# Patient Record
Sex: FEMALE | Race: WHITE | NOT HISPANIC OR LATINO | Employment: STUDENT | URBAN - METROPOLITAN AREA
[De-identification: names, ages, dates, MRNs, and addresses within clinical notes are randomized per-mention and may not be internally consistent; named-entity substitution may affect disease eponyms.]

---
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
## 2025-04-03 ENCOUNTER — HOSPITAL ENCOUNTER (OUTPATIENT)
Facility: HOSPITAL | Age: 20
Discharge: HOME | End: 2025-04-04
Attending: STUDENT IN AN ORGANIZED HEALTH CARE EDUCATION/TRAINING PROGRAM | Admitting: UROLOGY
Payer: COMMERCIAL

## 2025-04-03 DIAGNOSIS — N20.1 RIGHT URETERAL STONE: ICD-10-CM

## 2025-04-03 LAB
ALBUMIN SERPL-MCNC: 3.9 G/DL (ref 3.5–5)
ALBUMIN/GLOB SERPL: 1.3 RATIO (ref 1.1–2)
ALP SERPL-CCNC: 61 UNIT/L (ref 40–150)
ALT SERPL-CCNC: 10 UNIT/L (ref 0–55)
ANION GAP SERPL CALC-SCNC: 10 MEQ/L
AST SERPL-CCNC: 18 UNIT/L (ref 11–45)
B-HCG UR QL: NEGATIVE
BACTERIA #/AREA URNS AUTO: ABNORMAL /HPF
BASOPHILS # BLD AUTO: 0.02 X10(3)/MCL
BASOPHILS NFR BLD AUTO: 0.3 %
BILIRUB SERPL-MCNC: 0.5 MG/DL
BILIRUB UR QL STRIP.AUTO: ABNORMAL
BUN SERPL-MCNC: 12.7 MG/DL (ref 7–18.7)
CALCIUM SERPL-MCNC: 9.3 MG/DL (ref 8.4–10.2)
CHLORIDE SERPL-SCNC: 108 MMOL/L (ref 98–107)
CLARITY UR: ABNORMAL
CO2 SERPL-SCNC: 24 MMOL/L (ref 22–29)
COLOR UR AUTO: ABNORMAL
CREAT SERPL-MCNC: 1.03 MG/DL (ref 0.55–1.02)
CREAT/UREA NIT SERPL: 12
EOSINOPHIL # BLD AUTO: 0.03 X10(3)/MCL (ref 0–0.9)
EOSINOPHIL NFR BLD AUTO: 0.4 %
ERYTHROCYTE [DISTWIDTH] IN BLOOD BY AUTOMATED COUNT: 12 % (ref 11.5–17)
GFR SERPLBLD CREATININE-BSD FMLA CKD-EPI: >60 ML/MIN/1.73/M2
GLOBULIN SER-MCNC: 3.1 GM/DL (ref 2.4–3.5)
GLUCOSE SERPL-MCNC: 93 MG/DL (ref 74–100)
GLUCOSE UR QL STRIP: NEGATIVE
HCT VFR BLD AUTO: 41.5 % (ref 37–47)
HGB BLD-MCNC: 14.2 G/DL (ref 12–16)
HGB UR QL STRIP: ABNORMAL
IMM GRANULOCYTES # BLD AUTO: 0.03 X10(3)/MCL (ref 0–0.04)
IMM GRANULOCYTES NFR BLD AUTO: 0.4 %
KETONES UR QL STRIP: ABNORMAL
LEUKOCYTE ESTERASE UR QL STRIP: ABNORMAL
LIPASE SERPL-CCNC: 11 U/L
LYMPHOCYTES # BLD AUTO: 1.18 X10(3)/MCL (ref 0.6–4.6)
LYMPHOCYTES NFR BLD AUTO: 15.3 %
MCH RBC QN AUTO: 30 PG (ref 27–31)
MCHC RBC AUTO-ENTMCNC: 34.2 G/DL (ref 33–36)
MCV RBC AUTO: 87.6 FL (ref 80–94)
MONOCYTES # BLD AUTO: 0.56 X10(3)/MCL (ref 0.1–1.3)
MONOCYTES NFR BLD AUTO: 7.3 %
MUCOUS THREADS URNS QL MICRO: ABNORMAL /LPF
NEUTROPHILS # BLD AUTO: 5.89 X10(3)/MCL (ref 2.1–9.2)
NEUTROPHILS NFR BLD AUTO: 76.3 %
NITRITE UR QL STRIP: NEGATIVE
NRBC BLD AUTO-RTO: 0 %
PH UR STRIP: 6 [PH]
PLATELET # BLD AUTO: 223 X10(3)/MCL (ref 130–400)
PMV BLD AUTO: 11.3 FL (ref 7.4–10.4)
POTASSIUM SERPL-SCNC: 4.5 MMOL/L (ref 3.5–5.1)
PROT SERPL-MCNC: 7 GM/DL (ref 6.4–8.3)
PROT UR QL STRIP: ABNORMAL
RBC # BLD AUTO: 4.74 X10(6)/MCL (ref 4.2–5.4)
RBC #/AREA URNS AUTO: >100 /HPF
SODIUM SERPL-SCNC: 142 MMOL/L (ref 136–145)
SP GR UR STRIP.AUTO: >=1.03 (ref 1–1.03)
SQUAMOUS #/AREA URNS LPF: ABNORMAL /HPF
UROBILINOGEN UR STRIP-ACNC: 0.2
WBC # BLD AUTO: 7.71 X10(3)/MCL (ref 4.5–11.5)
WBC #/AREA URNS AUTO: >100 /HPF

## 2025-04-03 PROCEDURE — 96374 THER/PROPH/DIAG INJ IV PUSH: CPT

## 2025-04-03 PROCEDURE — 87086 URINE CULTURE/COLONY COUNT: CPT | Performed by: PHYSICIAN ASSISTANT

## 2025-04-03 PROCEDURE — 96375 TX/PRO/DX INJ NEW DRUG ADDON: CPT

## 2025-04-03 PROCEDURE — 80053 COMPREHEN METABOLIC PANEL: CPT | Performed by: PHYSICIAN ASSISTANT

## 2025-04-03 PROCEDURE — 85025 COMPLETE CBC W/AUTO DIFF WBC: CPT | Performed by: PHYSICIAN ASSISTANT

## 2025-04-03 PROCEDURE — G0378 HOSPITAL OBSERVATION PER HR: HCPCS

## 2025-04-03 PROCEDURE — 83690 ASSAY OF LIPASE: CPT | Performed by: PHYSICIAN ASSISTANT

## 2025-04-03 PROCEDURE — 25000003 PHARM REV CODE 250

## 2025-04-03 PROCEDURE — 96376 TX/PRO/DX INJ SAME DRUG ADON: CPT

## 2025-04-03 PROCEDURE — 81025 URINE PREGNANCY TEST: CPT | Performed by: PHYSICIAN ASSISTANT

## 2025-04-03 PROCEDURE — 25500020 PHARM REV CODE 255

## 2025-04-03 PROCEDURE — 99285 EMERGENCY DEPT VISIT HI MDM: CPT | Mod: 25

## 2025-04-03 PROCEDURE — 63600175 PHARM REV CODE 636 W HCPCS

## 2025-04-03 PROCEDURE — 81001 URINALYSIS AUTO W/SCOPE: CPT | Performed by: PHYSICIAN ASSISTANT

## 2025-04-03 RX ORDER — SODIUM CHLORIDE 9 MG/ML
1000 INJECTION, SOLUTION INTRAVENOUS
Status: COMPLETED | OUTPATIENT
Start: 2025-04-03 | End: 2025-04-03

## 2025-04-03 RX ORDER — CEFTRIAXONE 1 G/1
1 INJECTION, POWDER, FOR SOLUTION INTRAMUSCULAR; INTRAVENOUS
Status: COMPLETED | OUTPATIENT
Start: 2025-04-03 | End: 2025-04-03

## 2025-04-03 RX ORDER — ONDANSETRON HYDROCHLORIDE 2 MG/ML
4 INJECTION, SOLUTION INTRAVENOUS
Status: COMPLETED | OUTPATIENT
Start: 2025-04-03 | End: 2025-04-03

## 2025-04-03 RX ORDER — KETOROLAC TROMETHAMINE 30 MG/ML
15 INJECTION, SOLUTION INTRAMUSCULAR; INTRAVENOUS EVERY 6 HOURS PRN
Status: DISCONTINUED | OUTPATIENT
Start: 2025-04-03 | End: 2025-04-04 | Stop reason: HOSPADM

## 2025-04-03 RX ORDER — KETOROLAC TROMETHAMINE 30 MG/ML
30 INJECTION, SOLUTION INTRAMUSCULAR; INTRAVENOUS
Status: DISCONTINUED | OUTPATIENT
Start: 2025-04-03 | End: 2025-04-03

## 2025-04-03 RX ORDER — MORPHINE SULFATE 4 MG/ML
4 INJECTION, SOLUTION INTRAMUSCULAR; INTRAVENOUS EVERY 4 HOURS PRN
Status: DISCONTINUED | OUTPATIENT
Start: 2025-04-03 | End: 2025-04-04 | Stop reason: HOSPADM

## 2025-04-03 RX ORDER — KETOROLAC TROMETHAMINE 30 MG/ML
15 INJECTION, SOLUTION INTRAMUSCULAR; INTRAVENOUS
Status: COMPLETED | OUTPATIENT
Start: 2025-04-03 | End: 2025-04-03

## 2025-04-03 RX ADMIN — ONDANSETRON 4 MG: 2 INJECTION INTRAMUSCULAR; INTRAVENOUS at 10:04

## 2025-04-03 RX ADMIN — CEFTRIAXONE SODIUM 1 G: 1 INJECTION, POWDER, FOR SOLUTION INTRAMUSCULAR; INTRAVENOUS at 10:04

## 2025-04-03 RX ADMIN — KETOROLAC TROMETHAMINE 15 MG: 30 INJECTION, SOLUTION INTRAMUSCULAR; INTRAVENOUS at 10:04

## 2025-04-03 RX ADMIN — SODIUM CHLORIDE 1000 ML: 9 INJECTION, SOLUTION INTRAVENOUS at 10:04

## 2025-04-03 RX ADMIN — IOHEXOL 91 ML: 350 INJECTION, SOLUTION INTRAVENOUS at 09:04

## 2025-04-03 NOTE — FIRST PROVIDER EVALUATION
Medical screening examination initiated.  I have conducted a focused provider triage encounter, findings are as follows:    Brief history of present illness:  19yoWF w/right flank pain that radiates to RLQ. Now having chills. Currently on cycle.     Gastric sleeve dec 2024- [facility] in [place]    There were no vitals filed for this visit.    Pertinent physical exam:  NAD. Ambulatory.     Brief workup plan:  labs and imaging      Preliminary workup initiated; this workup will be continued and followed by the physician or advanced practice provider that is assigned to the patient when roomed.

## 2025-04-04 ENCOUNTER — ANESTHESIA (OUTPATIENT)
Dept: SURGERY | Facility: HOSPITAL | Age: 20
End: 2025-04-04
Payer: COMMERCIAL

## 2025-04-04 ENCOUNTER — NURSE TRIAGE (OUTPATIENT)
Dept: ADMINISTRATIVE | Facility: CLINIC | Age: 20
End: 2025-04-04

## 2025-04-04 ENCOUNTER — ANESTHESIA EVENT (OUTPATIENT)
Dept: SURGERY | Facility: HOSPITAL | Age: 20
End: 2025-04-04
Payer: COMMERCIAL

## 2025-04-04 VITALS
HEART RATE: 43 BPM | WEIGHT: 224.19 LBS | TEMPERATURE: 98 F | RESPIRATION RATE: 18 BRPM | DIASTOLIC BLOOD PRESSURE: 55 MMHG | OXYGEN SATURATION: 100 % | HEIGHT: 62 IN | SYSTOLIC BLOOD PRESSURE: 93 MMHG | BODY MASS INDEX: 41.26 KG/M2

## 2025-04-04 PROBLEM — N20.1 RIGHT URETERAL STONE: Status: ACTIVE | Noted: 2025-04-04

## 2025-04-04 PROCEDURE — 63600175 PHARM REV CODE 636 W HCPCS: Performed by: NURSE ANESTHETIST, CERTIFIED REGISTERED

## 2025-04-04 PROCEDURE — 27201423 OPTIME MED/SURG SUP & DEVICES STERILE SUPPLY: Performed by: UROLOGY

## 2025-04-04 PROCEDURE — C1894 INTRO/SHEATH, NON-LASER: HCPCS | Performed by: UROLOGY

## 2025-04-04 PROCEDURE — 36000706: Performed by: UROLOGY

## 2025-04-04 PROCEDURE — 63600175 PHARM REV CODE 636 W HCPCS: Performed by: UROLOGY

## 2025-04-04 PROCEDURE — D9220A PRA ANESTHESIA: Mod: ANES,,, | Performed by: ANESTHESIOLOGY

## 2025-04-04 PROCEDURE — 37000009 HC ANESTHESIA EA ADD 15 MINS: Performed by: UROLOGY

## 2025-04-04 PROCEDURE — C1769 GUIDE WIRE: HCPCS | Performed by: UROLOGY

## 2025-04-04 PROCEDURE — 37000008 HC ANESTHESIA 1ST 15 MINUTES: Performed by: UROLOGY

## 2025-04-04 PROCEDURE — 25500020 PHARM REV CODE 255: Performed by: UROLOGY

## 2025-04-04 PROCEDURE — G0378 HOSPITAL OBSERVATION PER HR: HCPCS

## 2025-04-04 PROCEDURE — 71000033 HC RECOVERY, INTIAL HOUR: Performed by: UROLOGY

## 2025-04-04 PROCEDURE — 25000003 PHARM REV CODE 250: Performed by: NURSE ANESTHETIST, CERTIFIED REGISTERED

## 2025-04-04 PROCEDURE — 36000707: Performed by: UROLOGY

## 2025-04-04 PROCEDURE — 25000003 PHARM REV CODE 250: Performed by: UROLOGY

## 2025-04-04 PROCEDURE — D9220A PRA ANESTHESIA: Mod: CRNA,,, | Performed by: NURSE ANESTHETIST, CERTIFIED REGISTERED

## 2025-04-04 PROCEDURE — C1758 CATHETER, URETERAL: HCPCS | Performed by: UROLOGY

## 2025-04-04 PROCEDURE — C2617 STENT, NON-COR, TEM W/O DEL: HCPCS | Performed by: UROLOGY

## 2025-04-04 DEVICE — STENT SET URETERAL 6X26CM: Type: IMPLANTABLE DEVICE | Site: URETER | Status: FUNCTIONAL

## 2025-04-04 RX ORDER — ONDANSETRON HYDROCHLORIDE 2 MG/ML
4 INJECTION, SOLUTION INTRAVENOUS DAILY PRN
Status: DISCONTINUED | OUTPATIENT
Start: 2025-04-04 | End: 2025-04-04 | Stop reason: HOSPADM

## 2025-04-04 RX ORDER — HYDROMORPHONE HYDROCHLORIDE 2 MG/ML
0.2 INJECTION, SOLUTION INTRAMUSCULAR; INTRAVENOUS; SUBCUTANEOUS EVERY 5 MIN PRN
Status: DISCONTINUED | OUTPATIENT
Start: 2025-04-04 | End: 2025-04-04 | Stop reason: HOSPADM

## 2025-04-04 RX ORDER — OXYCODONE HYDROCHLORIDE 5 MG/1
5 TABLET ORAL
Status: DISCONTINUED | OUTPATIENT
Start: 2025-04-04 | End: 2025-04-04 | Stop reason: HOSPADM

## 2025-04-04 RX ORDER — ROCURONIUM BROMIDE 10 MG/ML
INJECTION, SOLUTION INTRAVENOUS
Status: DISCONTINUED | OUTPATIENT
Start: 2025-04-04 | End: 2025-04-04

## 2025-04-04 RX ORDER — HYDROMORPHONE HYDROCHLORIDE 2 MG/ML
INJECTION, SOLUTION INTRAMUSCULAR; INTRAVENOUS; SUBCUTANEOUS
Status: DISCONTINUED | OUTPATIENT
Start: 2025-04-04 | End: 2025-04-04

## 2025-04-04 RX ORDER — LIDOCAINE HYDROCHLORIDE 20 MG/ML
INJECTION INTRAVENOUS
Status: DISCONTINUED | OUTPATIENT
Start: 2025-04-04 | End: 2025-04-04

## 2025-04-04 RX ORDER — GLUCAGON 1 MG
1 KIT INJECTION
Status: DISCONTINUED | OUTPATIENT
Start: 2025-04-04 | End: 2025-04-04 | Stop reason: HOSPADM

## 2025-04-04 RX ORDER — DIPHENHYDRAMINE HYDROCHLORIDE 50 MG/ML
25 INJECTION, SOLUTION INTRAMUSCULAR; INTRAVENOUS EVERY 6 HOURS PRN
Status: DISCONTINUED | OUTPATIENT
Start: 2025-04-04 | End: 2025-04-04 | Stop reason: HOSPADM

## 2025-04-04 RX ORDER — HALOPERIDOL LACTATE 5 MG/ML
0.5 INJECTION, SOLUTION INTRAMUSCULAR EVERY 10 MIN PRN
Status: DISCONTINUED | OUTPATIENT
Start: 2025-04-04 | End: 2025-04-04 | Stop reason: HOSPADM

## 2025-04-04 RX ORDER — PROPOFOL 10 MG/ML
VIAL (ML) INTRAVENOUS
Status: DISCONTINUED | OUTPATIENT
Start: 2025-04-04 | End: 2025-04-04

## 2025-04-04 RX ORDER — HYDROCODONE BITARTRATE AND ACETAMINOPHEN 7.5; 325 MG/1; MG/1
1 TABLET ORAL EVERY 6 HOURS PRN
Qty: 20 TABLET | Refills: 0 | Status: SHIPPED | OUTPATIENT
Start: 2025-04-04

## 2025-04-04 RX ORDER — MIDAZOLAM HYDROCHLORIDE 1 MG/ML
INJECTION INTRAMUSCULAR; INTRAVENOUS
Status: DISCONTINUED | OUTPATIENT
Start: 2025-04-04 | End: 2025-04-04

## 2025-04-04 RX ORDER — DEXMEDETOMIDINE HYDROCHLORIDE 100 UG/ML
INJECTION, SOLUTION INTRAVENOUS
Status: DISCONTINUED | OUTPATIENT
Start: 2025-04-04 | End: 2025-04-04

## 2025-04-04 RX ADMIN — CEFTRIAXONE SODIUM 2 G: 2 INJECTION, POWDER, FOR SOLUTION INTRAMUSCULAR; INTRAVENOUS at 01:04

## 2025-04-04 RX ADMIN — HYDROMORPHONE HYDROCHLORIDE 1 MG: 2 INJECTION, SOLUTION INTRAMUSCULAR; INTRAVENOUS; SUBCUTANEOUS at 01:04

## 2025-04-04 RX ADMIN — HYDROMORPHONE HYDROCHLORIDE 1 MG: 2 INJECTION, SOLUTION INTRAMUSCULAR; INTRAVENOUS; SUBCUTANEOUS at 02:04

## 2025-04-04 RX ADMIN — ROCURONIUM BROMIDE 50 MG: 10 SOLUTION INTRAVENOUS at 01:04

## 2025-04-04 RX ADMIN — PROPOFOL 150 MG: 10 INJECTION, EMULSION INTRAVENOUS at 01:04

## 2025-04-04 RX ADMIN — DEXMEDETOMIDINE 15 MCG: 200 INJECTION, SOLUTION INTRAVENOUS at 01:04

## 2025-04-04 RX ADMIN — LIDOCAINE HYDROCHLORIDE 40 MG: 20 INJECTION INTRAVENOUS at 01:04

## 2025-04-04 RX ADMIN — SODIUM CHLORIDE, SODIUM GLUCONATE, SODIUM ACETATE, POTASSIUM CHLORIDE AND MAGNESIUM CHLORIDE: 526; 502; 368; 37; 30 INJECTION, SOLUTION INTRAVENOUS at 01:04

## 2025-04-04 RX ADMIN — MIDAZOLAM HYDROCHLORIDE 2 MG: 1 INJECTION, SOLUTION INTRAMUSCULAR; INTRAVENOUS at 01:04

## 2025-04-04 NOTE — ED PROVIDER NOTES
Encounter Date: 4/3/2025       History     Chief Complaint   Patient presents with    Flank Pain     Pt arrives c/o R flank radiating to RLQ abdomen onset this morning. + Chills. Denies dysuria, hematuria, fever, n/v. LMP 3/31. Hx gastric sleeve Dec 2024 ago in [place].      The patient is a 19 y.o. female who presents to the Emergency Department with a chief complaint of right flank pain. Symptoms began today and have been intermittent since onset. Her pain is currently rated as a 0/10 in severity. Associated symptoms include nausea. Symptoms are aggravated with nothing and there are no alleviating factors. The patient denies fever, hematuria, or dysuria. She reports taking nothing prior to arrival with no relief of symptoms. No other reported symptoms at this time.      The history is provided by the patient. No  was used.   Flank Pain  This is a new problem. The current episode started 6 to 12 hours ago. The problem has been gradually improving. Associated symptoms include abdominal pain. Pertinent negatives include no chest pain, no headaches and no shortness of breath. Nothing aggravates the symptoms. Nothing relieves the symptoms. She has tried nothing for the symptoms. The treatment provided no relief.     Review of patient's allergies indicates:  No Known Allergies  Past Medical History:   Diagnosis Date    Eczema      History reviewed. No pertinent surgical history.  No family history on file.  Social History[1]  Review of Systems   Constitutional:  Negative for fever.   HENT:  Negative for sore throat.    Respiratory:  Negative for shortness of breath.    Cardiovascular:  Negative for chest pain.   Gastrointestinal:  Positive for abdominal pain and nausea. Negative for vomiting.   Genitourinary:  Positive for flank pain. Negative for dysuria, hematuria and urgency.   Musculoskeletal:  Negative for back pain.   Skin:  Negative for rash.   Neurological:  Negative for weakness and  headaches.   Hematological:  Does not bruise/bleed easily.   All other systems reviewed and are negative.      Physical Exam     Initial Vitals [04/03/25 1900]   BP Pulse Resp Temp SpO2   (!) 129/56 (!) 55 20 97.9 °F (36.6 °C) 99 %      MAP       --         Physical Exam    ED Course   Procedures  Labs Reviewed   COMPREHENSIVE METABOLIC PANEL - Abnormal       Result Value    Sodium 142      Potassium 4.5      Chloride 108 (*)     CO2 24      Glucose 93      Blood Urea Nitrogen 12.7      Creatinine 1.03 (*)     Calcium 9.3      Protein Total 7.0      Albumin 3.9      Globulin 3.1      Albumin/Globulin Ratio 1.3      Bilirubin Total 0.5      ALP 61      ALT 10      AST 18      eGFR >60      Anion Gap 10.0      BUN/Creatinine Ratio 12     URINALYSIS, REFLEX TO URINE CULTURE - Abnormal    Color, UA Pink-red (*)     Appearance, UA Turbid (*)     Specific Gravity, UA >=1.030      pH, UA 6.0      Protein, UA 2+ (*)     Glucose, UA Negative      Ketones, UA 1+ (*)     Blood, UA 3+ (*)     Bilirubin, UA 1+ (*)     Urobilinogen, UA 0.2      Nitrites, UA Negative      Leukocyte Esterase, UA 1+ (*)     RBC, UA >100 (*)     WBC, UA >100 (*)     Bacteria, UA Trace      Squamous Epithelial Cells, UA Many (*)     Mucous, UA Trace (*)    CBC WITH DIFFERENTIAL - Abnormal    WBC 7.71      RBC 4.74      Hgb 14.2      Hct 41.5      MCV 87.6      MCH 30.0      MCHC 34.2      RDW 12.0      Platelet 223      MPV 11.3 (*)     Neut % 76.3      Lymph % 15.3      Mono % 7.3      Eos % 0.4      Basophil % 0.3      Imm Grans % 0.4      Neut # 5.89      Lymph # 1.18      Mono # 0.56      Eos # 0.03      Baso # 0.02      Imm Gran # 0.03      NRBC% 0.0     LIPASE - Normal    Lipase Level 11     PREGNANCY TEST, URINE RAPID - Normal    hCG Qualitative, Urine Negative     CULTURE, URINE   CBC W/ AUTO DIFFERENTIAL    Narrative:     The following orders were created for panel order CBC auto differential.  Procedure                                Abnormality         Status                     ---------                               -----------         ------                     CBC with Differential[[id number]]       Abnormal            Final result                 Please view results for these tests on the individual orders.   POCT URINE PREGNANCY          Imaging Results              CT Abdomen Pelvis With IV Contrast NO Oral Contrast (Preliminary result)  Result time 04/03/25 21:40:35      Preliminary result by Gunner Causey MD (04/03/25 21:40:35)                   Narrative:    START OF REPORT:  Technique: CT of the abdomen and pelvis was performed with axial images as well as sagittal and coronal reconstruction images with intravenous contrast.    Comparison: None available.    Clinical History: Flank pain, kidney stone suspected, RLQ abdominal pain (Age >= 14y).    Dosage Information: Automated Exposure Control was utilized 1212.35 mGy.cm.    Findings:  Lines and Tubes: None.  Thorax:  Lungs: The visualized lung bases appear unremarkable.  Pleura: No effusions or thickening are seen.  Heart: The heart size is within normal limits.  Abdomen:  Abdominal Wall: No abdominal wall pathology is seen.  Liver: The liver appears unremarkable.  Biliary System: No intrahepatic or extrahepatic biliary duct dilatation is seen.  Gallbladder: The gallbladder appears unremarkable with no stones wall thickening or pericholecystic inflammatory changes or fluid.  Pancreas: The pancreas appears unremarkable.  Spleen: The spleen appears unremarkable.  Adrenals: The adrenal glands appear unremarkable.  Kidneys: A single stone measuring 2.3 mm is seen on Image 79, Series 2 in the lower pole of the left kidney. The left kidney otherwise appears unremarkable with no cysts masses or hydronephrosis identified. There is mild right hydroureteronephrosis due to a 7 mm obstructive stone in the distal right ureter (series 2 image 122). There is no associated perinephric or  periureteral fat stranding seen. The right kidney otherwise appears unremarkable with no cysts or masses.  Aorta: Unremarkable abdominal aorta without specific evidence of aneurysm or dissection.  Bowel:  Esophagus: The visualized esophagus appears unremarkable.  Stomach: Post surgical changes are noted. The stomach otherwise appears unremarkable.  Duodenum: Unremarkable appearing duodenum.  Small Bowel: The small bowel appears unremarkable.  Colon: Nondistended.  Appendix: The appendix appears unremarkable and is seen on Image 81, Series 2 through Image 93, Series 2.  Peritoneum: No intraperitoneal free air or ascites is seen.    Pelvis:  Bladder: The bladder is nondistended but appears otherwise unremarkable.  Female:  Uterus: The uterus appears unremarkable.  Ovaries: The ovaries appear unremarkable.    Bony structures:  Dorsal Spine: The visualized dorsal spine appears unremarkable.  Bony Pelvis: The visualized bony structures of the pelvis appear unremarkable.      Impression:  1. There is mild right hydroureteronephrosis due to a 7 mm obstructive stone in the distal right ureter (series 2 image 122). There is no associated perinephric or periureteral fat stranding seen.  2. Details and other findings as discussed above.                                         Medications   morphine injection 4 mg (has no administration in time range)   ketorolac injection 15 mg (has no administration in time range)   iohexoL (OMNIPAQUE 350) injection 91 mL (91 mLs Intravenous Given 4/3/25 2110)   ketorolac injection 15 mg (15 mg Intravenous Given 4/3/25 2218)   0.9% NaCl infusion (1,000 mLs Intravenous New Bag 4/3/25 2218)   ondansetron injection 4 mg (4 mg Intravenous Given 4/3/25 2229)   cefTRIAXone injection 1 g (1 g Intravenous Given 4/3/25 2229)     Medical Decision Making  The patient is a 19 y.o. female who presents to the Emergency Department with a chief complaint of right flank pain. Symptoms began today and have  been intermittent since onset. Her pain is currently rated as a 0/10 in severity. Associated symptoms include nausea. Symptoms are aggravated with nothing and there are no alleviating factors. The patient denies fever, hematuria, or dysuria. She reports taking nothing prior to arrival with no relief of symptoms. No other reported symptoms at this time.      Judging by the patient's chief complaint and pertinent history, the patient has the following possible differential diagnoses, including but not limited to the following.  Some of these are deemed to be lower likelihood and some more likely based on my physical exam and history combined with possible lab work and/or imaging studies.   Please see the pertinent studies, and refer to the HPI.  Some of these diagnoses will take further evaluation to fully rule out, perhaps as an outpatient and the patient was encouraged to follow up when discharged for more comprehensive evaluation.    appendicitis, biliary disease, diverticulitis,  AAA, ACS, mesenteric ischemia, intraabdominal abcess, retroperitoneal abcess, gastritis, gastroenteritis, hepatitis, hernia, pancreatitis, inflammatory bowel disease, PUD, SBP, nephrolithiasis, DKA, sickle cell crisis, consitpation, GERD, IBS     Problems Addressed:  Right ureteral stone: acute illness or injury    Amount and/or Complexity of Data Reviewed  Radiology:  Decision-making details documented in ED Course.  Discussion of management or test interpretation with external provider(s): Discussed with ED attending, , he had face to face encounter with patient.     Discussed with Dr. Menchaca who recommends admission.     Risk  Prescription drug management.               ED Course as of 04/03/25 2237   Thu Apr 03, 2025   2141 CT Abdomen Pelvis With IV Contrast NO Oral Contrast  Impression:  1. There is mild right hydroureteronephrosis due to a 7 mm obstructive stone in the distal right ureter (series 2 image 122). There is no  associated perinephric or periureteral fat stranding seen.  2. Details and other findings as discussed above.   [LM]   2209 Urology paged [LM]   2211 Discussed with Dr. Menchaca with urology who recommends antibiotics, admission, NPO after midnight. Okay to admit to urology services.  [LM]      ED Course User Index  [LM] Jose Oreilly NP                           Clinical Impression:  Final diagnoses:  [N20.1] Right ureteral stone          ED Disposition Condition    Observation Stable                  [1]   Social History  Tobacco Use    Smoking status: Never    Smokeless tobacco: Never   Substance Use Topics    Alcohol use: No    Drug use: No        Jose Oreilly NP  04/03/25 [id number]

## 2025-04-04 NOTE — H&P
Iraida Moulton 2005  [id number]  4/4/2025    CC:   Flank Pain       Pt arrives c/o R flank radiating to RLQ abdomen onset this morning. + Chills. Denies dysuria, hematuria, fever, n/v. LMP 3/31. Hx gastric sleeve Dec 2024 ago in [place].        HPI:  The patient is a 19-year-old female with a history of gastric sleeve in December 2024 who presented to the emergency room last night with sudden onset right lower quadrant abdominal pain.  She denies dysuria, hematuria, fever, chills.  She does report some nausea, has not had any vomiting.  She denies any history of kidney stones.  She denies frequent UTIs.  She continues with right flank pain radiating into the right lower abdomen.  She has been hemodynamically stable and afebrile since arrival.  Lab work reveals WBC 7.71, H&H 14.2/41.5, BUN and creatinine 12.7/1.03; UA appears grossly contaminated with turbid pink red urine, negative nitrites, 1+ leukocyte esterase, greater than 100 RBC and WBC, many squamous epithelial cells, trace bacteria.  CT abdomen and pelvis reveals mild right hydroureteronephrosis with distal 7 mm ureteral stone.    Past Medical History:   Diagnosis Date    Eczema      History reviewed. No pertinent surgical history.  No family history on file.  Social History[1]  Current Medications[2]  Review of patient's allergies indicates:  No Known Allergies    ROS: 12 point review of systems negative other than the HPI    PHYSICAL EXAM:  Vitals:    04/04/25 0327 04/04/25 0700 04/04/25 1045 04/04/25 1317   BP: 118/74 123/72 118/75 123/71   BP Location:  Left forearm Right forearm Right arm   Patient Position:  Sitting Sitting Lying   Pulse: (!) 43 (!) 44 (!) 50 (!) 58   Resp: 16 17 16 18   Temp: 97.7 °F (36.5 °C) 97.7 °F (36.5 °C) 97.9 °F (36.6 °C)    TempSrc: Oral Oral Oral    SpO2: 96% 100% 100% 100%   Weight:  101.7 kg (224 lb 3.3 oz)     Height:         No intake or output data in the 24 hours ending 04/04/25 1319    GEN: WN/WD NAD  HEENT:  normocephalic, atraumatic, PERRLA, EOMI, OP clear, nares patent  CV: RRR  RESP: Even and unlabored  ABD:  Soft, NT ND  :  No urine available for assessment  EXT: no C/C/E  NEURO: no focal deficits, MAEW, AAOx4    LABS:  Recent Results (from the past 24 hours)   Urinalysis, Reflex to Urine Culture Urine, Clean Catch    Collection Time: 04/03/25  7:08 PM    Specimen: Urine   Result Value Ref Range    Color, UA Pink-red (A) Yellow, Light-Yellow, Dark Yellow, Katheryn, Straw    Appearance, UA Turbid (A) Clear    Specific Gravity, UA >=1.030 1.005 - 1.030    pH, UA 6.0 5.0 - 8.5    Protein, UA 2+ (A) Negative    Glucose, UA Negative Negative, Normal    Ketones, UA 1+ (A) Negative    Blood, UA 3+ (A) Negative    Bilirubin, UA 1+ (A) Negative    Urobilinogen, UA 0.2 0.2, 1.0, Normal    Nitrites, UA Negative Negative    Leukocyte Esterase, UA 1+ (A) Negative    RBC, UA >100 (A) None Seen, 0-2, 3-5, 0-5 /HPF    WBC, UA >100 (A) None Seen, 0-2, 3-5, 0-5 /HPF    Bacteria, UA Trace None Seen, Trace /HPF    Squamous Epithelial Cells, UA Many (A) None Seen, Trace /HPF    Mucous, UA Trace (A) None Seen /LPF   Pregnancy, urine rapid    Collection Time: 04/03/25  7:09 PM   Result Value Ref Range    hCG Qualitative, Urine Negative Negative   Comprehensive metabolic panel    Collection Time: 04/03/25  7:44 PM   Result Value Ref Range    Sodium 142 136 - 145 mmol/L    Potassium 4.5 3.5 - 5.1 mmol/L    Chloride 108 (H) 98 - 107 mmol/L    CO2 24 22 - 29 mmol/L    Glucose 93 74 - 100 mg/dL    Blood Urea Nitrogen 12.7 7.0 - 18.7 mg/dL    Creatinine 1.03 (H) 0.55 - 1.02 mg/dL    Calcium 9.3 8.4 - 10.2 mg/dL    Protein Total 7.0 6.4 - 8.3 gm/dL    Albumin 3.9 3.5 - 5.0 g/dL    Globulin 3.1 2.4 - 3.5 gm/dL    Albumin/Globulin Ratio 1.3 1.1 - 2.0 ratio    Bilirubin Total 0.5 <=1.5 mg/dL    ALP 61 40 - 150 unit/L    ALT 10 0 - 55 unit/L    AST 18 11 - 45 unit/L    eGFR >60 mL/min/1.73/m2    Anion Gap 10.0 mEq/L    BUN/Creatinine Ratio 12     Lipase    Collection Time: 04/03/25  7:44 PM   Result Value Ref Range    Lipase Level 11 <=60 U/L   CBC with Differential    Collection Time: 04/03/25  7:44 PM   Result Value Ref Range    WBC 7.71 4.50 - 11.50 x10(3)/mcL    RBC 4.74 4.20 - 5.40 x10(6)/mcL    Hgb 14.2 12.0 - 16.0 g/dL    Hct 41.5 37.0 - 47.0 %    MCV 87.6 80.0 - 94.0 fL    MCH 30.0 27.0 - 31.0 pg    MCHC 34.2 33.0 - 36.0 g/dL    RDW 12.0 11.5 - 17.0 %    Platelet 223 130 - 400 x10(3)/mcL    MPV 11.3 (H) 7.4 - 10.4 fL    Neut % 76.3 %    Lymph % 15.3 %    Mono % 7.3 %    Eos % 0.4 %    Basophil % 0.3 %    Imm Grans % 0.4 %    Neut # 5.89 2.1 - 9.2 x10(3)/mcL    Lymph # 1.18 0.6 - 4.6 x10(3)/mcL    Mono # 0.56 0.1 - 1.3 x10(3)/mcL    Eos # 0.03 0 - 0.9 x10(3)/mcL    Baso # 0.02 <=0.2 x10(3)/mcL    Imm Gran # 0.03 0.00 - 0.04 x10(3)/mcL    NRBC% 0.0 %       IMAGING:  CT Abdomen Pelvis With IV Contrast NO Oral Contrast [[id number]] Resulted: 04/04/25 0549   Order Status: Completed Updated: 04/04/25 0551   Narrative:     EXAMINATION:  CT ABDOMEN PELVIS WITH IV CONTRAST    CLINICAL HISTORY:  Flank pain, kidney stone suspected;RLQ abdominal pain (Age >= 14y);    TECHNIQUE:  Axial images of the abdomen and pelvis were obtained with IV contrast administration.  Coronal and sagittal reconstructions were provided.  Three dimensional and MIP images were obtained and evaluated.  Total DLP was 1212 mGy-cm. Dose lowering technique and automated exposure control were utilized for this exam.    COMPARISON:  None    FINDINGS:  The bilateral lung bases are clear.  The heart is normal in size.    The liver is homogeneous in attenuation.  The portal vein is patent.  The gallbladder, spleen, pancreas and adrenal glands are normal.  The left kidney is normal.  There is mild right hydronephrosis and hydroureter.  There is a 7 mm stone in the distal right ureter best visualized on series 101, image 122.    There are postsurgical changes following gastric sleeve.  The small  bowel is decompressed.  The appendix is normal.  The colon is normal.  The uterus and adnexa are normal for age.  The urinary bladder is decompressed.  There is no pelvic or retroperitoneal adenopathy.  The aorta is nonaneurysmal.  There is no lytic or blastic osseous lesion.   Impression:       Mild right hydronephrosis and hydroureter secondary to a 7 mm stone in the distal right ureter.    [employer] concordance      Electronically signed by: Joss Sahni MD  Date: 04/04/2025  Time: 05:49         ASSESSMENT:  19-year-old female presents with sudden onset right flank and lower abdominal pain, imaging reveals 7 mm distal right ureteral stone with mild hydroureteronephrosis, no leukocytosis or fever, UA appears grossly contaminated    PLAN:  -discussed imaging with the patient.  Recommendation is for cystoscopy with right ureteroscopy, laser lithotripsy and stent placement today.  Informed consent obtained.  Keep NPO   -likely discharge home postoperatively        Katheryn Mcneil NP         [1]   Social History  Tobacco Use    Smoking status: Never    Smokeless tobacco: Never   Substance Use Topics    Alcohol use: No    Drug use: No   [2]   Current Facility-Administered Medications   Medication Dose Route Frequency Provider Last Rate Last Admin    ketorolac injection 15 mg  15 mg Intravenous Q6H PRN Jose Oreilly NP        morphine injection 4 mg  4 mg Intravenous Q4H PRN Jose Oreilly NP

## 2025-04-04 NOTE — TRANSFER OF CARE
"Anesthesia Transfer of Care Note    Patient: Iraida Moulton    Procedure(s) Performed: Procedure(s) (LRB):  CYSTOURETEROSCOPY, WITH HOLMIUM LASER LITHOTRIPSY OF URETERAL CALCULUS AND STENT INSERTION (Right)    Patient location: PACU    Anesthesia Type: general    Transport from OR: Transported from OR on room air with adequate spontaneous ventilation    Post pain: adequate analgesia    Post assessment: no apparent anesthetic complications and tolerated procedure well    Post vital signs: stable    Level of consciousness: sedated and responds to stimulation    Nausea/Vomiting: no nausea/vomiting    Complications: none    Transfer of care protocol was followed    Last vitals: Visit Vitals  BP (!) 97/59   Pulse (!) 43   Temp 36.6 °C (97.8 °F) (Temporal)   Resp (!) 8   Ht 5' 2" (1.575 m)   Wt 101.7 kg (224 lb 3.3 oz)   LMP 04/03/2025   SpO2 (!) 93%   Breastfeeding No   BMI 41.01 kg/m²     "

## 2025-04-04 NOTE — PLAN OF CARE
04/04/25 1618   Discharge Assessment   Assessment Type Discharge Planning Assessment   Confirmed/corrected address, phone number and insurance Yes   Confirmed Demographics Correct on Facesheet   Source of Information patient   Does patient/caregiver understand observation status Yes   Communicated MAAME with patient/caregiver Yes   Reason For Admission right urethral stone   People in Home friend(s)   Do you expect to return to your current living situation? Yes   Do you have help at home or someone to help you manage your care at home? Yes   Who are your caregiver(s) and their phone number(s)? friend alberto saucedo   Prior to hospitilization cognitive status: Unable to Assess   Current cognitive status: Alert/Oriented   Walking or Climbing Stairs Difficulty no   Dressing/Bathing Difficulty no   Equipment Currently Used at Home none   Readmission within 30 days? No   Patient currently being followed by outpatient case management? No   Do you currently have service(s) that help you manage your care at home? No   Do you take prescription medications? No   Do you have prescription coverage? No   Do you have any problems affording any of your prescribed medications? No   Who is going to help you get home at discharge? alberto saucedo   How do you get to doctors appointments? family or friend will provide   Are you on dialysis? No   Do you take coumadin? No   Discharge Plan A Home   Discharge Plan B Home   DME Needed Upon Discharge  none   Discharge Plan discussed with: Friend   Name(s) and Number(s) alberto saucedo   Transition of Care Barriers None   Housing Stability   In the last 12 months, was there a time when you were not able to pay the mortgage or rent on time? N   At any time in the past 12 months, were you homeless or living in a shelter (including now)? N   Transportation Needs   In the past 12 months, has lack of transportation kept you from medical appointments or from getting medications? no   In the  past 12 months, has lack of transportation kept you from meetings, work, or from getting things needed for daily living? No     Cystoscopy completed today.  Pt's friend Sepideh present to transport pt. Home.

## 2025-04-04 NOTE — ANESTHESIA PROCEDURE NOTES
Intubation    Date/Time: 4/4/2025 1:54 PM    Performed by: Sandy Gallegos CRNA  Authorized by: Bennett Rodrigez MD    Intubation:     Induction:  Intravenous    Intubated:  Postinduction    Mask Ventilation:  Easy mask    Attempts:  1    Attempted By:  CRNA    Method of Intubation:  Video laryngoscopy    Blade:  Dejesus 3    Laryngeal View Grade: Grade I - full view of cords      Difficult Airway Encountered?: No      Complications:  None    Airway Device:  Oral endotracheal tube    Airway Device Size:  7.5    Style/Cuff Inflation:  Cuffed    Inflation Amount (mL):  6    Tube secured:  21    Secured at:  The lips    Placement Verified By:  Colorimetric ETCO2 device    Complicating Factors:  None    Findings Post-Intubation:  BS equal bilateral

## 2025-04-04 NOTE — OP NOTE
[facility] - 8th Floor Med Surg  General Surgery  Operative Note    SUMMARY     Date of Procedure: 4/4/2025      Surgeon(s) and Role:     * Moo Menchaca MD - Primary    Assisting Surgeon: None    Pre-Operative Diagnosis: right 7mm distal stone    Post-Operative Diagnosis: same    Procedure: cysto left ureteroscopy with laser litho and stent placement    Anesthesia: gen    Estimated Blood Loss (EBL): min    Description of Technical Procedures: Pt was  prepped and draped in lithotomy. The cystoscope then guide wire placed. The orfice was dilated and rigid ureteroscopy was performed . The homium laser was used to break the stone into sand. The using a cystoscope a double j stent was placed with withdrawal line attached. Her bladder was drained . She was awoken and brought to recovery.

## 2025-04-04 NOTE — ANESTHESIA PREPROCEDURE EVALUATION
04/04/2025  Iraida Moulton is a 19 y.o., female.    18 yo presenting with flank pain, diagnosed large kidney stone on CT 7 mm with obstructing features     Past Medical History:   Diagnosis Date    Eczema      History reviewed. No pertinent surgical history.  Gastric restrictive procedure 2023    Facility-Administered Medications as of 4/4/2025   Medication Dose Route Frequency Provider Last Rate Last Admin    [COMPLETED] 0.9% NaCl infusion  1,000 mL Intravenous ED 1 Time Jose Oreilly,  mL/hr at 04/03/25 2218 1,000 mL at 04/03/25 2218    [COMPLETED] cefTRIAXone injection 1 g  1 g Intravenous ED 1 Time Jose Oreilly L, NP   1 g at 04/03/25 2229    [COMPLETED] iohexoL (OMNIPAQUE 350) injection 91 mL  91 mL Intravenous ONCE PRN Jose Oreilly, NP   91 mL at 04/03/25 2110    [COMPLETED] ketorolac injection 15 mg  15 mg Intravenous ED 1 Time Jose Oreilly, NP   15 mg at 04/03/25 2218    ketorolac injection 15 mg  15 mg Intravenous Q6H PRN Jose Oreilly, NP        morphine injection 4 mg  4 mg Intravenous Q4H PRN Jose Oreilly, NP        [COMPLETED] ondansetron injection 4 mg  4 mg Intravenous ED 1 Time Jose Oreilly NP   4 mg at 04/03/25 2229     Outpatient Medications as of 4/4/2025   Medication Sig Dispense Refill    diclofenac sodium (VOLTAREN) 1 % Gel Apply 4g  to affected area every 6 hr as needed for pain. 100 g 0    ibuprofen (ADVIL,MOTRIN) 600 MG tablet Take 1 tablet (600 mg total) by mouth every 6 (six) hours as needed for Pain. 20 tablet 0    phentermine (ADIPEX-P) 37.5 mg tablet Take 37.5 mg by mouth every morning.              Pre-op Assessment    I have reviewed the Patient Summary Reports.     I have reviewed the Nursing Notes. I have reviewed the NPO Status.   I have reviewed the Medications.     Review of Systems  Anesthesia Hx:  No problems with previous  Anesthesia                Renal/:   renal calculi               Endocrine:        Obesity / BMI > 30      Physical Exam  General: Cooperative, Alert and Oriented    Airway:  Mallampati: II   Mouth Opening: Normal  TM Distance: Normal  Tongue: Normal  Neck ROM: Normal ROM    Dental:  Retainer        Anesthesia Plan  Type of Anesthesia, risks & benefits discussed:    Anesthesia Type: Gen ETT  Intra-op Monitoring Plan: Standard ASA Monitors  Post Op Pain Control Plan: multimodal analgesia  Induction:  IV  Airway Plan: Direct, Post-Induction  Informed Consent: Informed consent signed with the Patient and all parties understand the risks and agree with anesthesia plan.  All questions answered.   ASA Score: 2  Day of Surgery Review of History & Physical: H&P Update referred to the surgeon/provider.I have interviewed and examined the patient. I have reviewed the patient's H&P dated: There are no significant changes.     Ready For Surgery From Anesthesia Perspective.     .

## 2025-04-04 NOTE — ANESTHESIA POSTPROCEDURE EVALUATION
Anesthesia Post Evaluation    Patient: Iraida Moulton    Procedure(s) Performed: Procedure(s) (LRB):  CYSTOURETEROSCOPY, WITH HOLMIUM LASER LITHOTRIPSY OF URETERAL CALCULUS AND STENT INSERTION (Right)    Final Anesthesia Type: general      Patient location during evaluation: PACU  Patient participation: Yes- Able to Participate  Level of consciousness: awake  Post-procedure vital signs: reviewed and stable  Pain management: adequate  Airway patency: patent  MAX mitigation strategies: Multimodal analgesia  PONV status at discharge: No PONV  Anesthetic complications: no      Cardiovascular status: hemodynamically stable  Respiratory status: spontaneous ventilation  Hydration status: euvolemic  Follow-up not needed.              Vitals Value Taken Time   /56 04/04/25 14:45   Temp 36.6 °C (97.8 °F) 04/04/25 14:45   Pulse 42 04/04/25 14:51   Resp 9 04/04/25 14:51   SpO2 94 % 04/04/25 14:51   Vitals shown include unfiled device data.      No case tracking events are documented in the log.      Pain/Bernardo Score: Pain Rating Prior to Med Admin: 6 (4/3/2025 10:18 PM)  Bernardo Score: 9 (4/4/2025  2:40 PM)

## 2025-04-05 ENCOUNTER — NURSE TRIAGE (OUTPATIENT)
Dept: ADMINISTRATIVE | Facility: CLINIC | Age: 20
End: 2025-04-05

## 2025-04-05 LAB — BACTERIA UR CULT: NO GROWTH

## 2025-04-05 NOTE — TELEPHONE ENCOUNTER
Patient calling, friend speaking for her. She had a cystoureteroscopy today and she accidentally pulled the string. She says the string is hanging out further than it was and a little blood came out and dripped down her leg. Pain level 3/10. Spoke to on call provider, Dr. Menchaca. Patient transferred to speak directly to . Please contact caller directly to discuss any further care advice.    Reason for Disposition   [1] Caller has URGENT question AND [2] triager unable to answer question    Additional Information   Negative: [1] Widespread rash AND [2] bright red, sunburn-like   Negative: [1] SEVERE headache AND [2] after spinal (epidural) anesthesia   Negative: [1] Vomiting AND [2] persists > 4 hours   Negative: [1] Vomiting AND [2] abdomen looks much more swollen than usual   Negative: [1] Drinking very little AND [2] dehydration suspected (e.g., no urine > 12 hours, very dry mouth, very lightheaded)   Negative: Patient sounds very sick or weak to the triager   Negative: Sounds like a serious complication to the triager   Negative: Fever > 100.4 F (38.0 C)   Negative: [1] SEVERE post-op pain (e.g., excruciating, pain scale 8-10) AND [2] not controlled with pain medications    Protocols used: Post-Op Symptoms and [id number]

## 2025-04-05 NOTE — TELEPHONE ENCOUNTER
Error, caller transferred to Charu Sanchez for further assistance with previous triage call. Caller connected to CHARU Sanchez for further assistance.   Reason for Disposition   Caller has already spoken with another triager or PCP AND has further questions AND triager able to answer questions.    Additional Information   Negative: Caller has already spoken with the PCP and has no further questions.   Negative: Caller has already spoken with another triager and has no further questions.    Protocols used: No Contact or Duplicate Contact Call-A-

## (undated) DEVICE — Device

## (undated) DEVICE — SOL NACL IRR 3000ML

## (undated) DEVICE — POSITIONER HEAD ADULT

## (undated) DEVICE — CATH POLLACK OPEN-END FLEXI-TI

## (undated) DEVICE — SHEATH URETERAL FLEXOR 12X20CM

## (undated) DEVICE — MARKER WRITESITE SKIN CHLRAPRP

## (undated) DEVICE — CYSTOGRAFIN CONTRAST MEDIA 30%

## (undated) DEVICE — GLOVE PROTEXIS LTX MICRO 6.5

## (undated) DEVICE — KIT SURGICAL TURNOVER

## (undated) DEVICE — TRAY SKIN SCRUB WET PREMIUM

## (undated) DEVICE — SUPPORT ULNA NERVE PROTECTOR

## (undated) DEVICE — BOWL STERILE LARGE 32OZ

## (undated) DEVICE — ADAPTER STOPCOCK FEMALE LL

## (undated) DEVICE — SOL NACL IRR 1000ML BTL

## (undated) DEVICE — BAG DRAIN UROLOGY AND HOSE

## (undated) DEVICE — WIRE GUIDE TEFLON 3CM .035 145